# Patient Record
Sex: MALE | HISPANIC OR LATINO | URBAN - METROPOLITAN AREA
[De-identification: names, ages, dates, MRNs, and addresses within clinical notes are randomized per-mention and may not be internally consistent; named-entity substitution may affect disease eponyms.]

---

## 2024-10-07 ENCOUNTER — LAB OUTSIDE AN ENCOUNTER (OUTPATIENT)
Dept: URBAN - METROPOLITAN AREA CLINIC 54 | Facility: CLINIC | Age: 59
End: 2024-10-07

## 2024-10-07 ENCOUNTER — OFFICE VISIT (OUTPATIENT)
Dept: URBAN - METROPOLITAN AREA CLINIC 54 | Facility: CLINIC | Age: 59
End: 2024-10-07
Payer: COMMERCIAL

## 2024-10-07 ENCOUNTER — DASHBOARD ENCOUNTERS (OUTPATIENT)
Age: 59
End: 2024-10-07

## 2024-10-07 VITALS
BODY MASS INDEX: 29.95 KG/M2 | HEIGHT: 63 IN | WEIGHT: 169 LBS | TEMPERATURE: 97.4 F | SYSTOLIC BLOOD PRESSURE: 139 MMHG | HEART RATE: 51 BPM | DIASTOLIC BLOOD PRESSURE: 83 MMHG

## 2024-10-07 DIAGNOSIS — R74.8 ELEVATED LIVER ENZYMES: ICD-10-CM

## 2024-10-07 DIAGNOSIS — Z86.0101 H/O ADENOMATOUS POLYP OF COLON: ICD-10-CM

## 2024-10-07 PROCEDURE — 99204 OFFICE O/P NEW MOD 45 MIN: CPT

## 2024-10-07 PROCEDURE — 99244 OFF/OP CNSLTJ NEW/EST MOD 40: CPT

## 2024-10-07 RX ORDER — ESCITALOPRAM OXALATE 5 MG/1
1 TABLET TABLET ORAL ONCE A DAY
Status: ACTIVE | COMMUNITY

## 2024-10-07 RX ORDER — OMEPRAZOLE 20 MG/1
1 CAPSULE 1/2 TO 1 HOUR BEFORE MORNING MEAL CAPSULE, DELAYED RELEASE ORAL ONCE A DAY
Status: ACTIVE | COMMUNITY

## 2024-10-07 NOTE — HPI-TODAY'S VISIT:
10/7/24: Patient is a 58 yo male with a PMH of controlled GERD, anxiety, and prostate cancer who was referred by Dr. Gustavo Rondon for elevated liver enzymes. A copy of this document will be sent to the provider. Persistently elevated transaminases over the last year with hepatic steatosis on RUQ US in Feb 2024. Denies any personal or family history of liver disease other than being told they have fatty liver. Pt does not drink etoh now, but admits to heavy etoh consumption about 20 years ago. Never smoker. No hx of illicit drugs, no IV or intranasal drug use. Only medications are omeprazole and escitalopram. No herbal supplements. BMI 29 but otherwise no metabolic disease; normal cholesterol, no DM or HTN. No complaints. Due for surveillance colonoscopy, last was in 2018 with 3 year surveillance.   Labs: - 11/6/23: AST 35, ALT 62, AP 93, TB 0.4 - 2/2/24: AST 41, ALT 86, AP 95, TB 0.6 - 5/3/24: AST 33, ALT 65, , TB 0.5 - 8/24/24: AST 51, , , TB 0.3  Colonoscopy 8/24/18: - Internal hemorrhoids. - Anal papilla(e) were hypertrophied. Biopsied. - One 8 mm polyp in the sigmoid colon, removed with a hot snare. Resected and retrieved. - One 10 mm polyp in the ascending colon, removed with a hot snare. Resected and retrieved. - The examination was otherwise normal. Biopsy: Multiple fragments of tubular adenomas. Anal bx with slightly hyperplastic colonic mucosa with several lymphoid aggregates

## 2024-10-09 ENCOUNTER — TELEPHONE ENCOUNTER (OUTPATIENT)
Dept: URBAN - METROPOLITAN AREA CLINIC 54 | Facility: CLINIC | Age: 59
End: 2024-10-09

## 2024-10-09 PROBLEM — 60737008: Status: ACTIVE | Noted: 2024-10-09

## 2024-10-09 LAB
ACTIN (SMOOTH MUSCLE) ANTIBODY: 8
ALBUMIN: 4.7
ALKALINE PHOSPHATASE: 95
ALPHA-1-ANTITRYPSIN, SERUM: 152
ALT (SGPT): 69
ANA DIRECT: NEGATIVE
AST (SGOT): 40
BASO (ABSOLUTE): 0
BASOS: 1
BILIRUBIN, DIRECT: 0.17
BILIRUBIN, TOTAL: 0.6
CERULOPLASMIN: 21.6
EOS (ABSOLUTE): 0.1
EOS: 2
FERRITIN, SERUM: 521
HBSAG SCREEN: NEGATIVE
HEMATOCRIT: 48.5
HEMATOLOGY COMMENTS:: (no result)
HEMOGLOBIN: 16.3
HEP B CORE AB, TOT: NEGATIVE
HEP C VIRUS AB: NON REACTIVE
IMMATURE CELLS: (no result)
IMMATURE GRANS (ABS): 0
IMMATURE GRANULOCYTES: 0
IMMUNOGLOBULIN A, QN, SERUM: 123
IMMUNOGLOBULIN E, TOTAL: 17
IMMUNOGLOBULIN G, QN, SERUM: 1272
IMMUNOGLOBULIN M, QN, SERUM: 88
INR: 1
IRON BIND.CAP.(TIBC): 338
IRON SATURATION: 48
IRON: 161
LYMPHS (ABSOLUTE): 1.7
LYMPHS: 38
MCH: 32.5
MCHC: 33.6
MCV: 97
MITOCHONDRIAL (M2) ANTIBODY: <20
MONOCYTES(ABSOLUTE): 0.3
MONOCYTES: 6
NEUTROPHILS (ABSOLUTE): 2.3
NEUTROPHILS: 53
NRBC: (no result)
PLATELETS: 186
PROTEIN, TOTAL: 6.8
PROTHROMBIN TIME: 11.4
RBC: 5.02
RDW: 13
UIBC: 177
WBC: 4.4

## 2024-10-21 LAB
HEREDITARY  HEMOCHROMATOSIS: (no result)
Lab: (no result)

## 2024-10-22 ENCOUNTER — TELEPHONE ENCOUNTER (OUTPATIENT)
Dept: URBAN - METROPOLITAN AREA CLINIC 54 | Facility: CLINIC | Age: 59
End: 2024-10-22

## 2024-12-19 ENCOUNTER — CLAIMS CREATED FROM THE CLAIM WINDOW (OUTPATIENT)
Dept: URBAN - METROPOLITAN AREA SURGERY CENTER 14 | Facility: SURGERY CENTER | Age: 59
End: 2024-12-19
Payer: COMMERCIAL

## 2024-12-19 ENCOUNTER — CLAIMS CREATED FROM THE CLAIM WINDOW (OUTPATIENT)
Dept: URBAN - METROPOLITAN AREA CLINIC 4 | Facility: CLINIC | Age: 59
End: 2024-12-19
Payer: COMMERCIAL

## 2024-12-19 ENCOUNTER — OFFICE VISIT (OUTPATIENT)
Dept: URBAN - METROPOLITAN AREA SURGERY CENTER 14 | Facility: SURGERY CENTER | Age: 59
End: 2024-12-19

## 2024-12-19 DIAGNOSIS — K57.30 COLONIC DIVERTICULOSIS: ICD-10-CM

## 2024-12-19 DIAGNOSIS — Z12.11 COLON CANCER SCREENING: ICD-10-CM

## 2024-12-19 DIAGNOSIS — K63.89 OTHER SPECIFIED DISEASES OF INTESTINE: ICD-10-CM

## 2024-12-19 DIAGNOSIS — K64.8 OTHER HEMORRHOIDS: ICD-10-CM

## 2024-12-19 PROCEDURE — 00812 ANES LWR INTST SCR COLSC: CPT | Performed by: NURSE ANESTHETIST, CERTIFIED REGISTERED

## 2024-12-19 PROCEDURE — 88305 TISSUE EXAM BY PATHOLOGIST: CPT | Performed by: STUDENT IN AN ORGANIZED HEALTH CARE EDUCATION/TRAINING PROGRAM

## 2024-12-19 RX ORDER — ESCITALOPRAM OXALATE 5 MG/1
1 TABLET TABLET ORAL ONCE A DAY
Status: ACTIVE | COMMUNITY

## 2024-12-19 RX ORDER — OMEPRAZOLE 20 MG/1
1 CAPSULE 1/2 TO 1 HOUR BEFORE MORNING MEAL CAPSULE, DELAYED RELEASE ORAL ONCE A DAY
Status: ACTIVE | COMMUNITY